# Patient Record
Sex: FEMALE | Race: WHITE | NOT HISPANIC OR LATINO | URBAN - METROPOLITAN AREA
[De-identification: names, ages, dates, MRNs, and addresses within clinical notes are randomized per-mention and may not be internally consistent; named-entity substitution may affect disease eponyms.]

---

## 2018-11-21 ENCOUNTER — HISTORICAL (OUTPATIENT)
Dept: ENDOSCOPY | Facility: HOSPITAL | Age: 38
End: 2018-11-21

## 2022-04-30 NOTE — OP NOTE
Patient:   Lois Yu             MRN: [id number]            FIN: [id number]               Age:   37 years     Sex:  Female     :  1980   Associated Diagnoses:   None   Author:   Ric Ortiz MD      procedure: esophagal Manometry  Physician: Dr. Tylor Ortiz    Indication: Hiatal hernia, regurgitation.     Findings: 10 wet swallows performed.   RElaxation was normal.   LES pressure was normal.  There were 2 failed swallows.  There was evidence of weak peristalsis on a few swallows.      Impression:   1.  No signs of achalasia  2.  Weak peristalsis      Plan:    1.  Pt should be ok for fixing her medium sized hernia with loose wrap.  I would not expect her to have any significant dysphagia with a loose wrap